# Patient Record
Sex: FEMALE | Race: WHITE | NOT HISPANIC OR LATINO | URBAN - METROPOLITAN AREA
[De-identification: names, ages, dates, MRNs, and addresses within clinical notes are randomized per-mention and may not be internally consistent; named-entity substitution may affect disease eponyms.]

---
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
## 2025-03-26 NOTE — ED PEDIATRIC TRIAGE NOTE - PAIN RATING/NUMBER SCALE (0-10): REST
6
FF: I discussed lab and radiology results with pt and pt family. pt family made aware pt ct is prelim. I discussed with pt and pt family plan to keep in obs for echo due to syncope. pt and pt family would like to leave because " pt will go crazy" and this happened last time pt was in the hospital and it took them a month to get her "back to life." pt reports they want to take her home and will bring her back for echo in the morning. The patient has decided to leave against medical advice.  It has been explained to the patient that leaving prior to completion of work up and treatment may result in recurrent or worsening of symptoms, severe permanent disability, pain and suffering, and/or even death.  The patient has demonstrated comprehension and verbalizes understanding of these risks.  The patient has been told that he/she must return to the ER immediately for persistent or recurring symptoms, worsening symptoms, or any concerning symptoms.  The patient has also been informed that they may return to the ER immediately at any time if they change their mind and wish to resume care. The patient has been given the opportunity to ask questions about their medical condition.